# Patient Record
Sex: MALE | Race: WHITE | Employment: STUDENT | URBAN - METROPOLITAN AREA
[De-identification: names, ages, dates, MRNs, and addresses within clinical notes are randomized per-mention and may not be internally consistent; named-entity substitution may affect disease eponyms.]

---
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
## 2025-01-17 DIAGNOSIS — F41.9 ANXIETY DISORDER, UNSPECIFIED TYPE: ICD-10-CM

## 2025-01-17 RX ORDER — VENLAFAXINE 37.5 MG/1
TABLET ORAL
Qty: 180 TABLET | Refills: 0 | Status: SHIPPED | OUTPATIENT
Start: 2025-01-17

## 2025-02-01 ENCOUNTER — APPOINTMENT (OUTPATIENT)
Dept: RADIOLOGY | Facility: HOSPITAL | Age: 20
End: 2025-02-01
Payer: COMMERCIAL

## 2025-02-01 ENCOUNTER — HOSPITAL ENCOUNTER (EMERGENCY)
Facility: HOSPITAL | Age: 20
Discharge: HOME | End: 2025-02-01
Attending: EMERGENCY MEDICINE
Payer: COMMERCIAL

## 2025-02-01 VITALS
DIASTOLIC BLOOD PRESSURE: 63 MMHG | HEIGHT: 68 IN | RESPIRATION RATE: 16 BRPM | WEIGHT: 140 LBS | BODY MASS INDEX: 21.22 KG/M2 | HEART RATE: 99 BPM | TEMPERATURE: 98.8 F | OXYGEN SATURATION: 98 % | SYSTOLIC BLOOD PRESSURE: 108 MMHG

## 2025-02-01 DIAGNOSIS — B34.9 VIRAL SYNDROME: ICD-10-CM

## 2025-02-01 DIAGNOSIS — J45.21 MILD INTERMITTENT ASTHMA WITH EXACERBATION (HHS-HCC): Primary | ICD-10-CM

## 2025-02-01 LAB
FLUAV RNA RESP QL NAA+PROBE: DETECTED
FLUBV RNA RESP QL NAA+PROBE: NOT DETECTED
SARS-COV-2 RNA RESP QL NAA+PROBE: NOT DETECTED

## 2025-02-01 PROCEDURE — 71046 X-RAY EXAM CHEST 2 VIEWS: CPT

## 2025-02-01 PROCEDURE — 71046 X-RAY EXAM CHEST 2 VIEWS: CPT | Performed by: RADIOLOGY

## 2025-02-01 PROCEDURE — 87636 SARSCOV2 & INF A&B AMP PRB: CPT

## 2025-02-01 PROCEDURE — 99284 EMERGENCY DEPT VISIT MOD MDM: CPT | Mod: 25 | Performed by: EMERGENCY MEDICINE

## 2025-02-01 PROCEDURE — 2500000004 HC RX 250 GENERAL PHARMACY W/ HCPCS (ALT 636 FOR OP/ED)

## 2025-02-01 PROCEDURE — 99284 EMERGENCY DEPT VISIT MOD MDM: CPT | Performed by: EMERGENCY MEDICINE

## 2025-02-01 PROCEDURE — 94640 AIRWAY INHALATION TREATMENT: CPT

## 2025-02-01 PROCEDURE — 2500000002 HC RX 250 W HCPCS SELF ADMINISTERED DRUGS (ALT 637 FOR MEDICARE OP, ALT 636 FOR OP/ED)

## 2025-02-01 PROCEDURE — 2500000001 HC RX 250 WO HCPCS SELF ADMINISTERED DRUGS (ALT 637 FOR MEDICARE OP)

## 2025-02-01 RX ORDER — PREDNISONE 20 MG/1
40 TABLET ORAL ONCE
Status: COMPLETED | OUTPATIENT
Start: 2025-02-01 | End: 2025-02-01

## 2025-02-01 RX ORDER — PREDNISONE 20 MG/1
40 TABLET ORAL DAILY
Qty: 8 TABLET | Refills: 0 | Status: SHIPPED | OUTPATIENT
Start: 2025-02-01 | End: 2025-02-05

## 2025-02-01 RX ORDER — ACETAMINOPHEN 325 MG/1
650 TABLET ORAL ONCE
Status: COMPLETED | OUTPATIENT
Start: 2025-02-01 | End: 2025-02-01

## 2025-02-01 RX ORDER — IPRATROPIUM BROMIDE AND ALBUTEROL SULFATE 2.5; .5 MG/3ML; MG/3ML
3 SOLUTION RESPIRATORY (INHALATION) ONCE
Status: COMPLETED | OUTPATIENT
Start: 2025-02-01 | End: 2025-02-01

## 2025-02-01 RX ADMIN — ACETAMINOPHEN 650 MG: 325 TABLET ORAL at 05:35

## 2025-02-01 RX ADMIN — PREDNISONE 40 MG: 20 TABLET ORAL at 05:35

## 2025-02-01 RX ADMIN — IPRATROPIUM BROMIDE AND ALBUTEROL SULFATE 3 ML: 2.5; .5 SOLUTION RESPIRATORY (INHALATION) at 05:35

## 2025-02-01 ASSESSMENT — PAIN SCALES - GENERAL
PAINLEVEL_OUTOF10: 8
PAINLEVEL_OUTOF10: 0 - NO PAIN

## 2025-02-01 ASSESSMENT — LIFESTYLE VARIABLES
EVER HAD A DRINK FIRST THING IN THE MORNING TO STEADY YOUR NERVES TO GET RID OF A HANGOVER: NO
EVER FELT BAD OR GUILTY ABOUT YOUR DRINKING: NO
TOTAL SCORE: 0
HAVE PEOPLE ANNOYED YOU BY CRITICIZING YOUR DRINKING: NO
HAVE YOU EVER FELT YOU SHOULD CUT DOWN ON YOUR DRINKING: NO

## 2025-02-01 ASSESSMENT — PAIN - FUNCTIONAL ASSESSMENT: PAIN_FUNCTIONAL_ASSESSMENT: 0-10

## 2025-02-01 NOTE — DISCHARGE INSTRUCTIONS
Seek immediate medical attention if you develop: worsening shortness of breath, difficulty breathing, chest pain, nausea, vomiting, weakness, numbness, tingling, excessive sweating, loss of motion in your arms or legs, or any new or worsening symptoms.  Take Tylenol, ibuprofen at home for your fever every 4-6 hours.  You may take breathing treatments every 2-4 hours as needed.   your prednisone prescription from your pharmacy.

## 2025-02-01 NOTE — ED PROVIDER NOTES
EMERGENCY DEPARTMENT ENCOUNTER      Pt Name: Casimiro To  MRN: [id number]  Birthdate 2005  Date of evaluation: 2/1/2025  Provider: Hermes Wen DO    CHIEF COMPLAINT       Chief Complaint   Patient presents with    Flu Symptoms     Cough, fever, x 3 days         HISTORY OF PRESENT ILLNESS    19-year-old, Hx asthma, comes emergency with 3 days of fever, cough, shortness of breath, did take a breathing treatment at home with moderate relief.  No rhinorrhea, no diarrhea, no abdominal pain, no other symptoms.  No history of ICU admissions for asthma.        History provided by:  Patient      Nursing Notes were reviewed.    PAST MEDICAL HISTORY     Past Medical History:   Diagnosis Date    Concussion without loss of consciousness, initial encounter 09/23/2019    Concussion without loss of consciousness, initial encounter    Other general symptoms and signs 09/23/2019    Light sensitivity    Personal history of other diseases of the nervous system and sense organs 09/23/2019    History of post-traumatic headache    Personal history of other diseases of the respiratory system 10/29/2020    History of sore throat    Personal history of other diseases of the respiratory system 05/10/2021    History of acute sinusitis         SURGICAL HISTORY       Past Surgical History:   Procedure Laterality Date    OTHER SURGICAL HISTORY  02/05/2022    No history of surgery         CURRENT MEDICATIONS       Previous Medications    ALBUTEROL 0.63 MG/3 ML NEBULIZER SOLUTION    INHALE THE CONTENTS OF 1 UNIT DOSE BY NEBULIZATION ROUTE EVERY 4-6 HOURS AS NEEDED    ALBUTEROL 90 MCG/ACTUATION INHALER    Inhale.    EPINEPHRINE (EPIPEN 2-SHIV) 0.3 MG/0.3 ML INJECTION SYRINGE    Inject 0.3 mL (0.3 mg) into the muscle 1 time if needed for anaphylaxis for up to 4 doses. Inject into upper leg. Call 911 after use.    HYDROCORTISONE 2.5 % OINTMENT    Apply 1 Application topically twice a day.    HYDROXYZINE HCL 10 MG/5 ML (5 ML) SOLUTION     Take by mouth.    LORATADINE-PSEUDOEPHEDRINE (CLARITIN-D 12 HOUR) 5-120 MG 12 HR TABLET    Take by mouth.    METHYLPREDNISOLONE (MEDROL DOSPAK) 4 MG TABLETS    Follow schedule on package instructions    VENLAFAXINE (EFFEXOR) 37.5 MG TABLET    TAKE 1 TABLET BY MOUTH EVERY DAY FOR 10 DAYS THEN INCREASE TO 2 TABLETS EACH DAY       ALLERGIES     Citrus and derivatives, Hornet venom, Lemon, Nut - unspecified, Other, Peanut, Shellfish containing products, Tree nuts, Azithromycin, Bee pollen, Erythromycin, and Morphine    FAMILY HISTORY       Family History   Family history unknown: Yes          SOCIAL HISTORY       Social History     Socioeconomic History    Marital status: Single   Tobacco Use    Smoking status: Never    Smokeless tobacco: Never   Vaping Use    Vaping status: Never Used   Substance and Sexual Activity    Alcohol use: Not Currently    Drug use: Never    Sexual activity: Defer       SCREENINGS                        PHYSICAL EXAM    (up to 7 for level 4, 8 or more for level 5)     ED Triage Vitals [02/01/25 0450]   Temperature Heart Rate Respirations BP   (!) 39.3 °C (102.7 °F) (!) 138 18 111/63      Pulse Ox Temp Source Heart Rate Source Patient Position   96 % Temporal Monitor Sitting      BP Location FiO2 (%)     Right arm --       Physical Exam  Vitals and nursing note reviewed.   Constitutional:       General: He is not in acute distress.  HENT:      Head: Normocephalic and atraumatic.      Mouth/Throat:      Pharynx: No oropharyngeal exudate or posterior oropharyngeal erythema.   Eyes:      General: No scleral icterus.        Right eye: No discharge.         Left eye: No discharge.      Conjunctiva/sclera: Conjunctivae normal.   Cardiovascular:      Rate and Rhythm: Normal rate and regular rhythm.      Pulses: Normal pulses.   Pulmonary:      Effort: Pulmonary effort is normal.      Breath sounds: No wheezing.      Comments: Decreased expiratory breath signs diffusely   Abdominal:      General:  Abdomen is flat.      Palpations: Abdomen is soft.      Tenderness: There is no abdominal tenderness. There is no guarding or rebound.   Musculoskeletal:         General: No deformity.      Right lower leg: No edema.      Left lower leg: No edema.   Skin:     General: Skin is warm and dry.   Neurological:      Mental Status: He is alert and oriented to person, place, and time. Mental status is at baseline.   Psychiatric:         Mood and Affect: Mood normal.         Behavior: Behavior normal.          DIAGNOSTIC RESULTS     LABS:  Labs Reviewed   SARS-COV-2 AND INFLUENZA A/B PCR       All other labs were within normal range or not returned as of this dictation.    Imaging  XR chest 2 views    (Results Pending)        Procedures  Procedures     EMERGENCY DEPARTMENT COURSE/MDM:     ED Course as of 02/01/25 0620   Sat Feb 01, 2025   0608 Reassessment, lungs are clear, to auscultation, patient is feeling better. [RD]      ED Course User Index  [RD] Hermes Wen DO         Diagnoses as of 02/01/25 0620   Mild intermittent asthma with exacerbation ([facility])   Viral syndrome        Medical Decision Making  19-year-old comes emergency room with 3 days of fever, cough, shortness of breath.  Has history of asthma, has diminished expiratory breath sounds diffusely.  Did take a breathing treatment at home with moderate relief, did give him a DuoNeb here, along with oral Tylenol given he was febrile and tachycardic initially.  Did not so administer dose of oral prednisone.  Treating him as a mild asthma exacerbation here today.  Suspect his heart rate is elevated secondary to his fever.  Do suspect an acute viral syndrome.  Did order x-ray to assess for possible developing focal pneumonia.  COVID and flu swabs were ordered as well.    On my review of his chest x-ray I do not see any focal pneumonia.  Patient was given oral Tylenol, and on reassessment after DuoNeb, lungs were clear, he had better air movement, felt better.  He  was discharged with prednisone burst, will follow-up with his primary care doctor.  His vitals did improve heart rate down trended on recheck, he was afebrile prior to discharge.  He will follow-up with PCP, return for new, concerning worsening symptoms.        Patient and or family in agreement and understanding of treatment plan.  All questions answered.      I reviewed the case with the attending ED physician. The attending ED physician agrees with the plan. Patient and/or patient´s representative was counseled regarding labs, imaging, likely diagnosis, and plan. All questions were answered.    ED Medications administered this visit:    Medications   ipratropium-albuteroL (Duo-Neb) 0.5-2.5 mg/3 mL nebulizer solution 3 mL (3 mL nebulization Given 2/1/25 0535)   predniSONE (Deltasone) tablet 40 mg (40 mg oral Given 2/1/25 0535)   acetaminophen (Tylenol) tablet 650 mg (650 mg oral Given 2/1/25 0535)       New Prescriptions from this visit:    New Prescriptions    PREDNISONE (DELTASONE) 20 MG TABLET    Take 2 tablets (40 mg) by mouth once daily for 4 days.       Follow-up:  Ernesto Barker DO  [id number] [facility] [address] [phone]    Schedule an appointment as soon as possible for a visit           Final Impression:   1. Mild intermittent asthma with exacerbation ([facility])    2. Viral syndrome          (Please note that portions of this note were completed with a voice recognition program.  Efforts were made to edit the dictations but occasionally words are mis-transcribed.)     Hermes Wen DO  Resident  02/01/25 2295

## 2025-02-01 NOTE — Clinical Note
Casimiro To was seen and treated in our emergency department on 2/1/2025.  He may return to school on 02/05/2025.      If you have any questions or concerns, please don't hesitate to call.      Hermes Wen, DO

## 2025-05-10 DIAGNOSIS — F41.9 ANXIETY DISORDER, UNSPECIFIED TYPE: ICD-10-CM

## 2025-05-30 ENCOUNTER — APPOINTMENT (OUTPATIENT)
Dept: UROLOGY | Facility: CLINIC | Age: 20
End: 2025-05-30
Payer: COMMERCIAL

## 2025-06-22 RX ORDER — VENLAFAXINE 37.5 MG/1
TABLET ORAL
Qty: 60 TABLET | Refills: 2 | OUTPATIENT
Start: 2025-06-22

## 2025-07-23 ENCOUNTER — APPOINTMENT (OUTPATIENT)
Dept: PRIMARY CARE | Facility: CLINIC | Age: 20
End: 2025-07-23
Payer: COMMERCIAL